# Patient Record
Sex: MALE | URBAN - METROPOLITAN AREA
[De-identification: names, ages, dates, MRNs, and addresses within clinical notes are randomized per-mention and may not be internally consistent; named-entity substitution may affect disease eponyms.]

---

## 2021-06-24 ENCOUNTER — HOSPITAL ENCOUNTER (EMERGENCY)
Dept: EMERGENCY MEDICINE | Facility: HOSPITAL | Age: 26
Discharge: HOME | End: 2021-06-25
Attending: EMERGENCY MEDICINE

## 2021-06-24 DIAGNOSIS — M25.571 PAIN IN JOINT INVOLVING ANKLE AND FOOT, RIGHT: ICD-10-CM
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
## 2021-06-24 DIAGNOSIS — S93.401A SPRAIN OF RIGHT ANKLE, UNSPECIFIED LIGAMENT, INITIAL ENCOUNTER: ICD-10-CM

## 2021-06-25 NOTE — ED TRIAGE NOTES
Pt was on an extension ladder tonight almost at the roof of a 2 story house, the ladder was not locked into position and started to retract with pt on it and lowering to the ground. Pt held on until he fell, unsure if he landed with both feet on the ladder or on the grass and then fell forward onto his knees. Pt c/o pain to both ankles radiating up calf/shins bilaterally. Denies head injury, LOC, neck or back pain or other injuries.

## 2021-06-26 NOTE — ED PROVIDER NOTES
EMERGENCY DEPARTMENT ENCOUNTER      NAME: Brian Lange  AGE: 25 y.o. male  YOB: 1995  MRN: [id number]  EVALUATION DATE & TIME: 6/24/2021 11:54 PM    PCP: Provider, No Primary Care    ED PROVIDER: Viri Clarke M.D.    Chief Complaint   Patient presents with     Fall         FINAL IMPRESSION:  1. Sprain of right ankle, unspecified ligament, initial encounter    2. Pain in joint involving ankle and foot, right    3. Fall, initial encounter        MEDICAL DECISION MAKING:    Pertinent Labs & Imaging studies reviewed. (See chart for details)    25 y.o. male presents to the Emergency Department for evaluation of ankle pain after a fall.  Patient reports that he fell off a ladder this afternoon and injured his right ankle.  He has had difficulty ambulating since.  He also complains of less severe pain in his left ankle which he attributes to favoring that leg given pain in contralateral ankle.  He denies head trauma or loss of consciousness and has no other new complaints or injuries.  He was feeling well prior to the fall.  He has not taken anything for pain.  Vitals on arrival stable and reassuring. Remainder of history and physical exam, as below.     X-rays of bilateral ankles and right tibia/fibula were ordered patient was awaiting a room in triage.  These showed no evidence of acute fracture, dislocation, or bony injury.  At this point, I do feel that history and exam is consistent with ankle sprain/sprain but cannot rule out occult/small fracture.  I reviewed results with patient and discussed options for management.  We have agreed on plan to place him in an air splint and arrange for close follow-up with [facility] for recheck.  He will go to their walk-in clinic tomorrow.  Tonight, will manage discomfort with Percocet x2.  I encouraged patient to use rest, ice, ibuprofen/Tylenol when he gets home.    Patient was fitted with an Aircast and assisted on use of crutches.  He felt comfortable with  this plan for disposition.  The importance of close follow up was discussed. We reviewed warning signs and symptoms, and I instructed Mr. Lange to return to the emergency department immediately if he develops any new or worsening symptoms. I provided additional verbal discharge instructions. Mr. Lange expressed understanding and agreement with this plan of care, his questions were answered, and he was discharged in stable condition.      ED COURSE:  12:04 AM I met with patient to gather history and perform an initial exam. Plans for ED stay discussed. Provider wore surgical mask, goggles, and gloves during all interactions with patient.   12:29 AM We discussed the plan for discharge and the patient is agreeable. Reviewed supportive cares, symptomatic treatment, outpatient follow up, and reasons to return to the Emergency Department. Patient to be discharged by ED RN.         MEDICATIONS GIVEN IN THE ED:  Medications   oxyCODONE-acetaminophen 5-325 mg 2 tablet (PERCOCET/ENDOCET) (2 tablets Oral Given 6/25/21 0022)       NEW PRESCRIPTIONS STARTED AT TODAY'S VISIT:  There are no discharge medications for this patient.        =================================================================    HPI  Patient information obtained from: Patient    Use of : N/A    Brian Lange is a 25 y.o. male  who presents to the ED via private car accompanied by family member for evaluation of ankle pain.    Patient reports sudden onset of constant, worsening sharp right ankle pain radiating up towards his right knee after falling from a ladder this afternoon. He states the extension was not locked in place and began sliding down. He landed on his bilateral feet and then fell forward to his bilateral knees. He is unable to bear weight on his right ankle and cannot flex or extend it without pain. He endorses pain in his left ankle, as well, but it is less severe than his right and he is able to flex and extend his left ankle. No  numbness or tingling.    Denies head injury, neck pain, back pain, arm pain, or any other complaints at this time.       REVIEW OF SYSTEMS   Review of Systems   Constitutional: Negative for fever.   Respiratory: Negative for cough.    Cardiovascular: Negative for chest pain.   Gastrointestinal: Negative for abdominal pain.   Musculoskeletal: Negative for back pain and neck pain.        Positive for mechanical fall off of ladder. Positive for right ankle pain (sharp, constant, radiating to right knee). Positive for left ankle pain (mild-moderate). Negative for arm pain.   Neurological: Negative for numbness and headaches.        Negative for tingling.   All other systems reviewed and are negative.       RELEVANT HISTORY, MEDICATIONS, & ALLERGIES   Past medical, surgical, social, and family history; medications; and allergies reviewed in Epic records. Pertinent information noted in HPI. See end of note for comprehensive list.    VITALS:  Patient Vitals for the past 24 hrs:   BP Temp Temp src Pulse Resp SpO2 Weight   06/24/21 2212 148/86 98.7  F (37.1  C) Oral 89 18 98 % 150 lb (68 kg)       PHYSICAL EXAM    Vitals: /86   Pulse 89   Temp 98.7  F (37.1  C) (Oral)   Resp 18   Wt 150 lb (68 kg)   SpO2 98%   General: Awake, alert, interactive. Sitting in wheelchair.   Eyes: PERRL.   HENT: Atraumatic. MMM.   Neck: Full AROM.  Cardiovascular: Regular rate.  Respiratory/Chest: Normal work of breathing.   Abdomen: Non-distended.   Musculoskeletal: Normal appearing extremities without obvious deformities or signs of trauma.   Left lower extremity: very minimal tenderness to medial and lateral malleoli, normal range of motion of hip, knee, and slightly limited range of motion of ankle secondary to pain.   Right lower extremity: Diffuse tenderness and edema to medial and lateral malleoli and foot, no gross deformity, palpable DP and PT pulses, distal CMS intact, mild tenderness over distal leg and superficial abrasion  over anterior shin, normal range of motion of hip, knee, and limited range of motion to ankle secondary to pain.   Skin: Normal color. No rash or diaphoresis.  Neurologic: Alert, oriented. Speech clear. CN's grossly intact. Moving all extremities spontaneously.   Psychiatric: Normal affect/mood.  RADIOLOGY:  Reviewed all pertinent imaging. Please see official radiology report.  Xr Tibia And Fibula Right    Result Date: 6/24/2021  EXAM: XR TIBIA AND FIBULA RIGHT LOCATION: [facility] DATE/TIME: 6/24/2021 10:42 PM INDICATION: fall, landing on feet with ankle and lower leg pain COMPARISON: None.     Normal tibia and fibula.    Xr Ankle Left 3 Or More Vws    Result Date: 6/24/2021  EXAM: XR ANKLE LEFT 3 OR MORE VWS LOCATION: [facility] DATE/TIME: 6/24/2021 10:42 PM INDICATION: fall from a height, landing on feet COMPARISON: None.     Normal joint spaces and alignment. No fracture.    Xr Ankle Right 3 Or More Vws    Result Date: 6/24/2021  EXAM: XR ANKLE RIGHT 3 OR MORE VWS LOCATION: [facility] DATE/TIME: 6/24/2021 10:41 PM INDICATION: fall, ankle pain COMPARISON: None.     Mild soft tissue swelling about the ankle. No acute bony abnormality.      Comprehensive Outline of History per [facility]    PAST MEDICAL HISTORY:  History reviewed. No pertinent past medical history.    PAST SURGICAL HISTORY:  Relevant past surgical history reviewed with patient, unless otherwise stated in HPI, history not pertinent to this visit.    CURRENT MEDICATIONS:    No current facility-administered medications on file prior to encounter.      No current outpatient medications on file prior to encounter.       ALLERGIES:  No Known Allergies    FAMILY HISTORY:  History reviewed. No pertinent family history.    SOCIAL HISTORY:   Social History     Socioeconomic History     Marital status: Single     Spouse name: None     Number of children: None     Years of education: None      Highest education level: None   Occupational History     None   Social Needs     Financial resource strain: None     Food insecurity     Worry: None     Inability: None     Transportation needs     Medical: None     Non-medical: None   Tobacco Use     Smoking status: Never Smoker   Substance and Sexual Activity     Alcohol use: None     Drug use: No     Sexual activity: None   Lifestyle     Physical activity     Days per week: None     Minutes per session: None     Stress: None   Relationships     Social connections     Talks on phone: None     Gets together: None     Attends [employer] service: None     Active member of club or organization: None     Attends meetings of clubs or organizations: None     Relationship status: None     Intimate partner violence     Fear of current or ex partner: None     Emotionally abused: None     Physically abused: None     Forced sexual activity: None   Other Topics Concern     None   Social History Narrative     None       I, Tiera Henao, am serving as a scribe to document services personally performed by Dr. Viri Clarke based on my observation and the provider's statements to me. IViri MD attest that Tiera Henao is acting in a scribe capacity, has observed my performance of the services and has documented them in accordance with my direction.    Viri Clarke M.D.  Emergency Medicine  [facility] EMERGENCY DEPARTMENT  [address]  [facility] [address]  Dept: [phone]  Loc: [phone]         Viri Clarke MD  06/25/21 0436

## 2021-07-06 VITALS — WEIGHT: 150 LBS
